# Patient Record
Sex: FEMALE | Race: WHITE | Employment: PART TIME | URBAN - METROPOLITAN AREA
[De-identification: names, ages, dates, MRNs, and addresses within clinical notes are randomized per-mention and may not be internally consistent; named-entity substitution may affect disease eponyms.]

---

## 2017-11-14 ENCOUNTER — HOSPITAL ENCOUNTER (OUTPATIENT)
Dept: MAMMOGRAPHY | Age: 52
Discharge: HOME | End: 2017-11-14
Attending: PEDIATRICS
Payer: COMMERCIAL

## 2017-11-14 DIAGNOSIS — Z12.31 ENCOUNTER FOR SCREENING MAMMOGRAM FOR MALIGNANT NEOPLASM OF BREAST: ICD-10-CM

## 2017-11-14 PROCEDURE — 77063 BREAST TOMOSYNTHESIS BI: CPT | Performed by: OBSTETRICS & GYNECOLOGY

## 2017-11-14 PROCEDURE — 77067 SCR MAMMO BI INCL CAD: CPT | Performed by: OBSTETRICS & GYNECOLOGY

## 2019-01-16 ENCOUNTER — HOSPITAL ENCOUNTER (OUTPATIENT)
Dept: MAMMOGRAPHY | Age: 54
Discharge: HOME | End: 2019-01-16
Attending: OBSTETRICS & GYNECOLOGY
Payer: COMMERCIAL

## 2019-01-16 DIAGNOSIS — Z12.39 BREAST SCREENING: ICD-10-CM

## 2019-01-16 PROCEDURE — 77067 SCR MAMMO BI INCL CAD: CPT | Performed by: OBSTETRICS & GYNECOLOGY

## 2019-01-16 PROCEDURE — 77063 BREAST TOMOSYNTHESIS BI: CPT | Performed by: OBSTETRICS & GYNECOLOGY

## 2021-04-15 ENCOUNTER — OFFICE VISIT (OUTPATIENT)
Dept: FAMILY MEDICINE CLINIC | Facility: CLINIC | Age: 56
End: 2021-04-15
Payer: COMMERCIAL

## 2021-04-15 VITALS
TEMPERATURE: 99 F | WEIGHT: 178 LBS | SYSTOLIC BLOOD PRESSURE: 118 MMHG | BODY MASS INDEX: 27.94 KG/M2 | HEIGHT: 67 IN | RESPIRATION RATE: 16 BRPM | OXYGEN SATURATION: 97 % | DIASTOLIC BLOOD PRESSURE: 70 MMHG | HEART RATE: 67 BPM

## 2021-04-15 DIAGNOSIS — R73.01 IFG (IMPAIRED FASTING GLUCOSE): ICD-10-CM

## 2021-04-15 DIAGNOSIS — E78.2 MIXED HYPERLIPIDEMIA: Primary | ICD-10-CM

## 2021-04-15 DIAGNOSIS — Z12.11 COLON CANCER SCREENING: ICD-10-CM

## 2021-04-15 DIAGNOSIS — E55.9 VITAMIN D INSUFFICIENCY: ICD-10-CM

## 2021-04-15 PROCEDURE — 3074F SYST BP LT 130 MM HG: CPT | Performed by: FAMILY MEDICINE

## 2021-04-15 PROCEDURE — 3078F DIAST BP <80 MM HG: CPT | Performed by: FAMILY MEDICINE

## 2021-04-15 PROCEDURE — 3008F BODY MASS INDEX DOCD: CPT | Performed by: FAMILY MEDICINE

## 2021-04-15 PROCEDURE — 99204 OFFICE O/P NEW MOD 45 MIN: CPT | Performed by: FAMILY MEDICINE

## 2021-04-15 NOTE — PROGRESS NOTES
Patient presents with:  New Patient: establish care & to discuss lab results done 2/16/21. HPI:  Ivanna Ratliff is a 64year old female here today for establishing care. Impaired fasting glucose–hemoglobin A1c was 5.7 on 2/16/2021.  Has tried mul on file    Social Determinants of Health  Financial Resource Strain:       Difficulty of Paying Living Expenses:   Food Insecurity:       Worried About Running Out of Food in the Last Year:       Ran Out of Food in the Last Year:   Transportation Needs: nontender, nondistended. MUSCULOSKELETAL: no kyphosis. Normal gait. NEUROLOGIC:  Cranial nerves 2-12 grossly intact. PSYCHIATRIC:  Normal mood, affect, and hygiene. ASSESSMENT/PLAN:    1. Mixed hyperlipidemia  - LIPID PANEL;  Future  -discussed diet/

## 2021-04-15 NOTE — PATIENT INSTRUCTIONS
Understanding USDA MyPlate  The USDA has guidelines to help you make healthy food choices. These are called MyPlate. MyPlate shows the food groups that make up healthy meals using the image of a place setting.  Before you eat, think about the healthiest c canola, corn, olive, soybean, vegetable, and sunflower oil. Foods that are mainly oil include mayonnaise, certain salad dressings, and soft margarines. You likely already get your daily oil allowance from the foods you eat.   Things to limit  Eating healthy weight. Your diet doesn’t have to be bland and boring to be healthy. Just watch your calories and follow these steps:     Step 1. Eat fewer unhealthy fats  · Choose more fish and lean meats instead of fatty cuts of meat.   · Skip butter and lard, and use le serving size is a standard measurement. It will let you track the amount of fat, calories, and other nutrients in the food you eat. · Read the Nutrition Facts label on packaged foods to learn their serving sizes.   · Use serving sizes to assess how much fo beans, and peas  · Eating less red meat and processed meats  · Using low-fat dairy products  · Using vegetable and nut oils in limited amounts  · Limiting how many sweets and processed foods like chips, cookies, and baked goods that you eat   · Limiting ho cholesterol high, even if you make all the other changes you’re supposed to  · Damages your body. It especially harms your heart, lungs, skin, and blood vessels.   · Makes you more likely to have a heart attack (acute myocardial infarction), stroke, or canc intermediate, high, or very-high risk groups or if you have a multiple risk (enhancers) conditions. Calculating a risk score is done looking at certain risk factors that are considered as an increased risk for atherosclerosis.  The risk calculator tool lo foods or give up desserts. Instead, your dietitian can show you how to plan meals to suit your body. To start, learn how different foods affect blood sugar. Carbohydrates  Carbohydrates (carbs) are the main source of fuel for the body.  They raise blood ocampo cholesterol. So, choosing them instead of saturated fats is healthy for your heart. Certain unsaturated fats can help lower triglycerides.   Less healthy:  · Saturated fats. These are found in animal products, such as meat, poultry, whole milk, lard, and bu don't mix. So our bodies need lipoproteins (lipids inside a protein shell) to carry the lipids.  The protein shell carries its lipids through the bloodstream. There are two main kinds of lipoproteins:  · LDL (low-density lipoprotein) is known as \"bad león active. · Start at a level where you feel comfortable. Increase your time and pace a little each week. · Work up to 30 to 40 minutes of moderate to high intensity physical activity at least 3 to 4 days per week.   · Remember, some activity is better than and shellfish  · Lean beef, pork, or lamb (without visible fat)  · Soy products, such as tofu, soybeans (edamame), tempeh, or soymilk  · Black beans, kidney beans, best beans, chickpeas (garbanzo beans), and lentils (Note: beans and peas count as both a p These include coconut oil, palm oil, and palm kernel oil. These should be consumed less often than other oils. Oils and other fats are also high in calories. Eating too much fat can lead to weight gain and increased risk of heart disease.    Fat facts  Some

## 2021-05-05 ENCOUNTER — HOSPITAL ENCOUNTER (OUTPATIENT)
Dept: MAMMOGRAPHY | Age: 56
Discharge: HOME | End: 2021-05-05
Attending: OBSTETRICS & GYNECOLOGY
Payer: COMMERCIAL

## 2021-05-05 DIAGNOSIS — Z12.39 ENCOUNTER FOR SCREENING FOR MALIGNANT NEOPLASM OF BREAST, UNSPECIFIED SCREENING MODALITY: ICD-10-CM

## 2021-05-05 PROCEDURE — 77067 SCR MAMMO BI INCL CAD: CPT | Performed by: OBSTETRICS & GYNECOLOGY

## 2021-05-05 PROCEDURE — 77063 BREAST TOMOSYNTHESIS BI: CPT | Performed by: OBSTETRICS & GYNECOLOGY

## 2023-06-03 ENCOUNTER — APPOINTMENT (OUTPATIENT)
Dept: GENERAL RADIOLOGY | Age: 58
End: 2023-06-03
Attending: EMERGENCY MEDICINE
Payer: COMMERCIAL

## 2023-06-03 ENCOUNTER — HOSPITAL ENCOUNTER (EMERGENCY)
Age: 58
Discharge: HOME | End: 2023-06-03
Attending: EMERGENCY MEDICINE
Payer: COMMERCIAL

## 2023-06-03 VITALS
OXYGEN SATURATION: 97 % | TEMPERATURE: 98 F | HEART RATE: 70 BPM | HEIGHT: 66 IN | DIASTOLIC BLOOD PRESSURE: 78 MMHG | WEIGHT: 170 LBS | SYSTOLIC BLOOD PRESSURE: 138 MMHG | RESPIRATION RATE: 16 BRPM | BODY MASS INDEX: 27.32 KG/M2

## 2023-06-03 DIAGNOSIS — S92.101A CLOSED NONDISPLACED FRACTURE OF RIGHT TALUS, UNSPECIFIED PORTION OF TALUS, INITIAL ENCOUNTER: Primary | ICD-10-CM

## 2023-06-03 PROCEDURE — 99284 EMERGENCY DEPT VISIT MOD MDM: CPT

## 2023-06-03 PROCEDURE — 29515 APPLICATION SHORT LEG SPLINT: CPT

## 2023-06-03 PROCEDURE — 73630 X-RAY EXAM OF FOOT: CPT | Performed by: EMERGENCY MEDICINE

## 2023-06-03 NOTE — ED QUICK NOTES
Cesar came back to desk after splint and instructed md that pt refuses crutches and stated she will not use them. This rn went down to comfirm that pt did not want crutches. 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 She read the report and wanted to see the images again. When asked again about getting crutches pt looked off and refused to answer.

## 2024-10-30 ENCOUNTER — OFFICE VISIT (OUTPATIENT)
Dept: FAMILY MEDICINE CLINIC | Facility: CLINIC | Age: 59
End: 2024-10-30
Payer: COMMERCIAL

## 2024-10-30 ENCOUNTER — LAB ENCOUNTER (OUTPATIENT)
Dept: LAB | Age: 59
End: 2024-10-30
Attending: FAMILY MEDICINE
Payer: COMMERCIAL

## 2024-10-30 VITALS
DIASTOLIC BLOOD PRESSURE: 70 MMHG | HEIGHT: 66.5 IN | HEART RATE: 68 BPM | OXYGEN SATURATION: 98 % | RESPIRATION RATE: 16 BRPM | SYSTOLIC BLOOD PRESSURE: 122 MMHG | TEMPERATURE: 98 F | WEIGHT: 179 LBS | BODY MASS INDEX: 28.43 KG/M2

## 2024-10-30 DIAGNOSIS — Z12.31 ENCOUNTER FOR SCREENING MAMMOGRAM FOR MALIGNANT NEOPLASM OF BREAST: ICD-10-CM

## 2024-10-30 DIAGNOSIS — Z00.00 LABORATORY EXAM ORDERED AS PART OF ROUTINE GENERAL MEDICAL EXAMINATION: ICD-10-CM

## 2024-10-30 DIAGNOSIS — E78.2 MIXED HYPERLIPIDEMIA: ICD-10-CM

## 2024-10-30 DIAGNOSIS — E55.9 VITAMIN D INSUFFICIENCY: ICD-10-CM

## 2024-10-30 DIAGNOSIS — Z12.11 SCREENING FOR COLON CANCER: ICD-10-CM

## 2024-10-30 DIAGNOSIS — Z23 NEED FOR VACCINATION: ICD-10-CM

## 2024-10-30 DIAGNOSIS — Z00.00 ROUTINE MEDICAL EXAM: Primary | ICD-10-CM

## 2024-10-30 DIAGNOSIS — Z87.81 HISTORY OF ANKLE FRACTURE: ICD-10-CM

## 2024-10-30 DIAGNOSIS — R73.01 IFG (IMPAIRED FASTING GLUCOSE): ICD-10-CM

## 2024-10-30 DIAGNOSIS — R23.8 PAPULE OF SKIN: ICD-10-CM

## 2024-10-30 LAB
ALBUMIN SERPL-MCNC: 4.7 G/DL (ref 3.2–4.8)
ALBUMIN/GLOB SERPL: 1.6 {RATIO} (ref 1–2)
ALP LIVER SERPL-CCNC: 110 U/L
ALT SERPL-CCNC: 26 U/L
ANION GAP SERPL CALC-SCNC: 8 MMOL/L (ref 0–18)
AST SERPL-CCNC: 22 U/L (ref ?–34)
BASOPHILS # BLD AUTO: 0.06 X10(3) UL (ref 0–0.2)
BASOPHILS NFR BLD AUTO: 1.1 %
BILIRUB SERPL-MCNC: 0.5 MG/DL (ref 0.3–1.2)
BUN BLD-MCNC: 13 MG/DL (ref 9–23)
CALCIUM BLD-MCNC: 9.9 MG/DL (ref 8.7–10.4)
CHLORIDE SERPL-SCNC: 104 MMOL/L (ref 98–112)
CHOLEST SERPL-MCNC: 294 MG/DL (ref ?–200)
CO2 SERPL-SCNC: 26 MMOL/L (ref 21–32)
CREAT BLD-MCNC: 0.86 MG/DL
EGFRCR SERPLBLD CKD-EPI 2021: 78 ML/MIN/1.73M2 (ref 60–?)
EOSINOPHIL # BLD AUTO: 0.12 X10(3) UL (ref 0–0.7)
EOSINOPHIL NFR BLD AUTO: 2.3 %
ERYTHROCYTE [DISTWIDTH] IN BLOOD BY AUTOMATED COUNT: 13.4 %
EST. AVERAGE GLUCOSE BLD GHB EST-MCNC: 117 MG/DL (ref 68–126)
FASTING PATIENT LIPID ANSWER: YES
FASTING STATUS PATIENT QL REPORTED: YES
GLOBULIN PLAS-MCNC: 2.9 G/DL (ref 2–3.5)
GLUCOSE BLD-MCNC: 108 MG/DL (ref 70–99)
HBA1C MFR BLD: 5.7 % (ref ?–5.7)
HCT VFR BLD AUTO: 44.3 %
HDLC SERPL-MCNC: 66 MG/DL (ref 40–59)
HGB BLD-MCNC: 14.4 G/DL
IMM GRANULOCYTES # BLD AUTO: 0.01 X10(3) UL (ref 0–1)
IMM GRANULOCYTES NFR BLD: 0.2 %
LDLC SERPL CALC-MCNC: 204 MG/DL (ref ?–100)
LYMPHOCYTES # BLD AUTO: 1.88 X10(3) UL (ref 1–4)
LYMPHOCYTES NFR BLD AUTO: 35.6 %
MCH RBC QN AUTO: 29.6 PG (ref 26–34)
MCHC RBC AUTO-ENTMCNC: 32.5 G/DL (ref 31–37)
MCV RBC AUTO: 91 FL
MONOCYTES # BLD AUTO: 0.48 X10(3) UL (ref 0.1–1)
MONOCYTES NFR BLD AUTO: 9.1 %
NEUTROPHILS # BLD AUTO: 2.73 X10 (3) UL (ref 1.5–7.7)
NEUTROPHILS # BLD AUTO: 2.73 X10(3) UL (ref 1.5–7.7)
NEUTROPHILS NFR BLD AUTO: 51.7 %
NONHDLC SERPL-MCNC: 228 MG/DL (ref ?–130)
OSMOLALITY SERPL CALC.SUM OF ELEC: 287 MOSM/KG (ref 275–295)
PLATELET # BLD AUTO: 329 10(3)UL (ref 150–450)
POTASSIUM SERPL-SCNC: 4.7 MMOL/L (ref 3.5–5.1)
PROT SERPL-MCNC: 7.6 G/DL (ref 5.7–8.2)
RBC # BLD AUTO: 4.87 X10(6)UL
SODIUM SERPL-SCNC: 138 MMOL/L (ref 136–145)
TRIGL SERPL-MCNC: 132 MG/DL (ref 30–149)
VIT D+METAB SERPL-MCNC: 24.5 NG/ML (ref 30–100)
VLDLC SERPL CALC-MCNC: 28 MG/DL (ref 0–30)
WBC # BLD AUTO: 5.3 X10(3) UL (ref 4–11)

## 2024-10-30 PROCEDURE — 82306 VITAMIN D 25 HYDROXY: CPT | Performed by: FAMILY MEDICINE

## 2024-10-30 PROCEDURE — 83036 HEMOGLOBIN GLYCOSYLATED A1C: CPT | Performed by: FAMILY MEDICINE

## 2024-10-30 PROCEDURE — 90656 IIV3 VACC NO PRSV 0.5 ML IM: CPT | Performed by: FAMILY MEDICINE

## 2024-10-30 PROCEDURE — 90472 IMMUNIZATION ADMIN EACH ADD: CPT | Performed by: FAMILY MEDICINE

## 2024-10-30 PROCEDURE — 80061 LIPID PANEL: CPT | Performed by: FAMILY MEDICINE

## 2024-10-30 PROCEDURE — 85025 COMPLETE CBC W/AUTO DIFF WBC: CPT | Performed by: FAMILY MEDICINE

## 2024-10-30 PROCEDURE — 80053 COMPREHEN METABOLIC PANEL: CPT | Performed by: FAMILY MEDICINE

## 2024-10-30 PROCEDURE — 90471 IMMUNIZATION ADMIN: CPT | Performed by: FAMILY MEDICINE

## 2024-10-30 PROCEDURE — 99386 PREV VISIT NEW AGE 40-64: CPT | Performed by: FAMILY MEDICINE

## 2024-10-30 PROCEDURE — 3008F BODY MASS INDEX DOCD: CPT | Performed by: FAMILY MEDICINE

## 2024-10-30 PROCEDURE — 3078F DIAST BP <80 MM HG: CPT | Performed by: FAMILY MEDICINE

## 2024-10-30 PROCEDURE — 90715 TDAP VACCINE 7 YRS/> IM: CPT | Performed by: FAMILY MEDICINE

## 2024-10-30 PROCEDURE — 3074F SYST BP LT 130 MM HG: CPT | Performed by: FAMILY MEDICINE

## 2024-10-30 NOTE — PROGRESS NOTES
Chief Complaint   Patient presents with    New Patient    Physical    Immunization/Injection     Influenza vaccine today & discuss the Shingrix vaccine.       HPI:  Marsha Herrera is a 59 year old female here today for preventative visit.      Imms- discussed flu, COVID, shingrix, & Tdap.      Cervical cancer screening- + LGSIL + HPV on 5/13. Colposcopy 6/11/13 negative. Normal co-testing on 1/3/19 and 2/16/21 with Dr. Juan Jose Ragland. May go to paps q 5yrs.      Breast cancer screening- Brandt  & her daughter (cousin with pre-CA changes) had breast cancer at 63y/o. No mamm on file since 2021.      Colon cancer screening- No family h/o colon cancer.       Osteoporosis screening- no reason identified for early screening with dexa. Had a R ankle fx 6/23 going down a step. Mom had osteoporosis.       Diet/exercise- working on this      Dental/Eye Check up-  Recommended pt see dentist once every 6 months for a cleaning and once every year for an eye exam.        Due to check on chronic issues of IFG, HL, and vit d insuffic.    Papule of neck- been there for a while, but gets irritated. No change in size or bleeding.        Past Medical History:    Ankle fracture    R, going down a step    Bulging lumbar disc    L4-5    History of osteomyelitis    thumb    Hyperlipidemia    IFG (impaired fasting glucose)    Mixed hyperlipidemia    Vitamin D insufficiency     Past Surgical History:   Procedure Laterality Date    Catheter care  1988    for IV abx x 6 wks for osteomyelitis thumb    Lasik Bilateral 2003    [place] teeth removed       Medications Ordered Prior to Encounter[1]  Allergies[2]  Social History     Socioeconomic History    Marital status:      Spouse name: Not on file    Number of children: Not on file    Years of education: Not on file    Highest education level: Not on file   Occupational History    Occupation: X-ray tech, cath lab   Tobacco Use    Smoking status: Some Days     Types: Cigarettes     Start date:  2023     Last attempt to quit: 2000     Years since quittin.8    Smokeless tobacco: Never    Tobacco comments:     Maybe 5 cigs a week   Vaping Use    Vaping status: Never Used   Substance and Sexual Activity    Alcohol use: Yes     Alcohol/week: 6.0 standard drinks of alcohol     Types: 6 Cans of beer per week     Comment: 6 on weekend, never a problem    Drug use: Never    Sexual activity: Yes     Partners: Male     Comment: menopause   Other Topics Concern    Caffeine Concern No    Exercise Yes     Comment: Walking    Seat Belt Yes    Special Diet No    Stress Concern No    Weight Concern No   Social History Narrative    Not on file     Social Drivers of Health     Financial Resource Strain: Not on file   Food Insecurity: Not on file   Transportation Needs: Not on file   Physical Activity: Not on file   Stress: Not on file   Social Connections: Not on file   Housing Stability: Not on file     Family History   Problem Relation Age of Onset    Cancer Mother         Mulitple myeloma    Thyroid Disorder Mother         hypothyroidism    Asthma Mother     Diabetes Father     Hypertension Father     Heart Disease Father     Heart Disorder Father         Cabg    Other (kidney stones) Sister     No Known Problems Brother     Dementia Maternal Grandmother     No Known Problems Maternal Grandfather     Heart Disorder Paternal Grandmother         heart attack    Dementia Paternal Grandmother     Breast Cancer Maternal Aunt 62       Review of Systems - All systems reviewed and negative except for HPI    PHYSICAL EXAM:  /70   Pulse 68   Temp 97.9 °F (36.6 °C) (Temporal)   Resp 16   Ht 5' 6.5\" (1.689 m)   Wt 179 lb (81.2 kg)   SpO2 98%   BMI 28.46 kg/m²   GENERAL APPEARANCE:  Alert, no acute distress, appears stated age  HEENT:  Head- Normocephalic, atraumatic.    Eyes- Extraocular movements intact, pupils equally round and reactive to light,  conjunctivae normal.    Ears- Tympanic membranes intact  bilaterally.    Nose- Patent, normal septum and turbinates.    Mouth/Throat- Normal oral mucosa, throat non-erythematous.  NECK:  No submental, submandibular, ant/post cervical lymphadenopathy. No thyromegaly or masses.  PULMONARY:  Lungs clear to auscultation bilaterally. No wheezes, rales, or rhonchi. Normal respiratory effort.  CARDIOVASCULAR:  Regular rate and rhythm. No murmurs, gallops, or rubs.  ABDOMEN:  + bowel sounds, soft, nontender, nondistended. No hepatomegaly.  MUSCULOSKELETAL: Strength of upper and lower extremities 5/5 bilaterally. Normal gait.  NEUROLOGIC:  Cranial nerves 2-12 grossly intact.  PSYCHIATRIC:  Normal mood, affect, and hygiene.  Breasts: breast appear normal, no suspicious masses, no skin or nipple changes/discharge. No supraclavicular or axillary nodes. Declined chaperone  SKIN: R neck 0.4 x -0.4cm papule skin colored with rough surface.       ASSESSMENT/PLAN:    1. Routine medical exam    2. Screening for colon cancer  - Gastro Referral - In Network    3. Encounter for screening mammogram for malignant neoplasm of breast  - [facility] AYAD 2D+3D SCREENING BILAT (CPT=77067/82380); Future    4. Laboratory exam ordered as part of routine general medical examination  - CBC W Differential W Platelet [E]; Future  - Comp Metabolic Panel (14) [E]; Future  - Lipid Panel; Future    5. Need for vaccination  - Immunization Admin Counseling, 1st Component, 18 years and older  - Fluzone trivalent vaccine, PF 0.5mL, 6mo+ (79958)  - Immunization Admin Counseling, Additional Component, 18 years and older  - TdaP (Boostrix/Adacel) Vaccine (> 7 Y)    6. IFG (impaired fasting glucose)  - Hemoglobin A1C [E]; Future    7. Mixed hyperlipidemia  - Comp Metabolic Panel (14) [E]; Future  - Lipid Panel; Future    8. History of ankle fracture  - XR DEXA BONE DENSITOMETRY (CPT=77080); Future    9. Vitamin D insufficiency  - Vitamin D [E]; Future    10. Papule of skin  -diff dx: wart vs seborrheic keratosis  -shave bx  when convenient      Patient verbalized understanding and agrees to plan.      Return in about 1 year (around 10/30/2025) for annual physical, we will contact you with results, skin shave biopsy (40min procedure).         [1]   No current outpatient medications on file prior to visit.     No current facility-administered medications on file prior to visit.   [2] No Known Allergies

## 2024-11-12 ENCOUNTER — OFFICE VISIT (OUTPATIENT)
Dept: FAMILY MEDICINE CLINIC | Facility: CLINIC | Age: 59
End: 2024-11-12
Payer: COMMERCIAL

## 2024-11-12 VITALS
RESPIRATION RATE: 16 BRPM | HEART RATE: 66 BPM | TEMPERATURE: 98 F | SYSTOLIC BLOOD PRESSURE: 120 MMHG | OXYGEN SATURATION: 98 % | WEIGHT: 179 LBS | BODY MASS INDEX: 28.43 KG/M2 | DIASTOLIC BLOOD PRESSURE: 70 MMHG | HEIGHT: 66.5 IN

## 2024-11-12 DIAGNOSIS — R23.8 PAPULE OF SKIN: Primary | ICD-10-CM

## 2024-11-12 PROCEDURE — 3008F BODY MASS INDEX DOCD: CPT | Performed by: FAMILY MEDICINE

## 2024-11-12 PROCEDURE — 88305 TISSUE EXAM BY PATHOLOGIST: CPT | Performed by: FAMILY MEDICINE

## 2024-11-12 PROCEDURE — 3078F DIAST BP <80 MM HG: CPT | Performed by: FAMILY MEDICINE

## 2024-11-12 PROCEDURE — 3074F SYST BP LT 130 MM HG: CPT | Performed by: FAMILY MEDICINE

## 2024-11-12 PROCEDURE — 11420 EXC H-F-NK-SP B9+MARG 0.5/<: CPT | Performed by: FAMILY MEDICINE

## 2024-11-12 NOTE — PROGRESS NOTES
[facility] Visit Note  11/12/2024      Subjective:      Patient ID: Marsha Herrera is a 59 year old female.    Chief Complaint:  Chief Complaint   Patient presents with    Procedure     Here for shave biopsy of right side neck lesion.       HPI:  Marsha Herrera is a 59 year old female who is being seen today for procedure.      Neck lesion- been there for a while, but gets irritated. No change in size, bleeding, drainage. Requesting lesion be removed.           Review of Systems - as stated above in the HPI      Objective:     Vitals:    11/12/24 0749   BP: 120/70   Pulse: 66   Resp: 16   Temp: 98.1 °F (36.7 °C)   TempSrc: Temporal   SpO2: 98%   Weight: 179 lb (81.2 kg)   Height: 5' 6.5\" (1.689 m)       Physical Examination   General:  Alert, in no acute distress  HEENT: NCAT, EOMI, mucus membranes moist   Neck:  No cervical lymphadenopathy  CV: Regular rate and rhythm. No murmurs, gallops, or rubs.  Lungs:  Clear to auscultation B, no wheezes, rales, or rhonchi, normal respiratory effort  Abd:  +bowel sounds, soft  Ext:  No pedal edema,  Pedal pulses 2+ B  SKIN: R neck 0.4 x -0.4cm papule skin colored with rough surface.     Procedure: Shave biopsy   Indication: Changing skin lesion   Physician: Dr. Scarlet Chase   Assistant: none  Consent: written consent was obtained after discussing potential alternatives, benefits, and risks including but not limited to: infection, pain, bleeding.   Description: The area was prepped with iodine. Site was anesthetized with 1 mL of lidocaine with epinephrine. Once adequate anesthesia was obtained lesion was removed using a 10 blade scalpel. Specimen was placed in a formalin container and sent for pathology analysis. Hemostasis was achieved with pressure & silver nitrate. Area cleaned with alcohol. DOM and a band-aid was applied.   EBL: <5mL  Complications: none        Assessment:     1. Papule of skin  - Specimen to Pathology, Tissue; Future  - Specimen to Pathology,  Tissue  -discussed signs/sxs that would warrant re-evaluation such as increasing pain, fever, drainage, redness, etc.  -pt verbalizes understanding and agrees to plan.        Return for we will contact you with results & f/u to discuss labs, call if concerns develop.

## 2024-12-04 ENCOUNTER — HOSPITAL ENCOUNTER (OUTPATIENT)
Dept: MAMMOGRAPHY | Age: 59
Discharge: HOME | End: 2024-12-04
Attending: FAMILY MEDICINE
Payer: COMMERCIAL

## 2024-12-04 ENCOUNTER — HOSPITAL ENCOUNTER (OUTPATIENT)
Dept: BONE DENSITY | Age: 59
Discharge: HOME | End: 2024-12-04
Attending: FAMILY MEDICINE
Payer: COMMERCIAL

## 2024-12-04 DIAGNOSIS — Z87.81 HISTORY OF ANKLE FRACTURE: ICD-10-CM

## 2024-12-04 DIAGNOSIS — Z12.31 ENCOUNTER FOR SCREENING MAMMOGRAM FOR MALIGNANT NEOPLASM OF BREAST: ICD-10-CM

## 2024-12-04 PROCEDURE — 77080 DXA BONE DENSITY AXIAL: CPT | Performed by: FAMILY MEDICINE

## 2024-12-04 PROCEDURE — 77067 SCR MAMMO BI INCL CAD: CPT | Performed by: FAMILY MEDICINE

## 2024-12-04 PROCEDURE — 77063 BREAST TOMOSYNTHESIS BI: CPT | Performed by: FAMILY MEDICINE

## 2024-12-11 ENCOUNTER — OFFICE VISIT (OUTPATIENT)
Dept: FAMILY MEDICINE CLINIC | Facility: CLINIC | Age: 59
End: 2024-12-11
Payer: COMMERCIAL

## 2024-12-11 VITALS
SYSTOLIC BLOOD PRESSURE: 122 MMHG | HEIGHT: 66.5 IN | HEART RATE: 62 BPM | WEIGHT: 179 LBS | OXYGEN SATURATION: 99 % | RESPIRATION RATE: 16 BRPM | TEMPERATURE: 98 F | BODY MASS INDEX: 28.43 KG/M2 | DIASTOLIC BLOOD PRESSURE: 62 MMHG

## 2024-12-11 DIAGNOSIS — E55.9 VITAMIN D INSUFFICIENCY: ICD-10-CM

## 2024-12-11 DIAGNOSIS — J06.9 VIRAL URI: ICD-10-CM

## 2024-12-11 DIAGNOSIS — Z71.6 ENCOUNTER FOR TOBACCO USE CESSATION COUNSELING: ICD-10-CM

## 2024-12-11 DIAGNOSIS — E78.01 FAMILIAL HYPERCHOLESTEROLEMIA: Primary | ICD-10-CM

## 2024-12-11 DIAGNOSIS — M85.80 OSTEOPENIA, UNSPECIFIED LOCATION: ICD-10-CM

## 2024-12-11 DIAGNOSIS — Z83.49 FAMILY HISTORY OF THYROID DISEASE: ICD-10-CM

## 2024-12-11 DIAGNOSIS — F17.200 CURRENT SMOKER ON SOME DAYS: ICD-10-CM

## 2024-12-11 PROCEDURE — 3074F SYST BP LT 130 MM HG: CPT | Performed by: FAMILY MEDICINE

## 2024-12-11 PROCEDURE — 3078F DIAST BP <80 MM HG: CPT | Performed by: FAMILY MEDICINE

## 2024-12-11 PROCEDURE — 3008F BODY MASS INDEX DOCD: CPT | Performed by: FAMILY MEDICINE

## 2024-12-11 PROCEDURE — 99214 OFFICE O/P EST MOD 30 MIN: CPT | Performed by: FAMILY MEDICINE

## 2024-12-11 PROCEDURE — G2211 COMPLEX E/M VISIT ADD ON: HCPCS | Performed by: FAMILY MEDICINE

## 2024-12-11 RX ORDER — ROSUVASTATIN CALCIUM 10 MG/1
10 TABLET, COATED ORAL NIGHTLY
Qty: 90 TABLET | Refills: 0 | Status: SHIPPED | OUTPATIENT
Start: 2024-12-11

## 2024-12-11 NOTE — PROGRESS NOTES
[facility] Visit Note  12/11/2024      Subjective:      Patient ID: Marsha Herrera is a 59 year old female.    Chief Complaint:  Chief Complaint   Patient presents with    Test Results     States her to discuss her 10/30/24 lab results & 12/4/24 dexa results.    Post Nasal Drip     States she woke up with a sore throat yesterday morning & post nasal drip. Sore throat has subsided but still having a lot of pnd. Took home covid test this morning which was negative.       HPI:  Marsha Herrera is a 59 year old female who is being seen today for the above.      Postnasal drip- she woke up with a sore throat yesterday morning & post nasal drip. Sore throat has subsided but still having a lot of pnd. Took home covid test this morning which was negative. Keeps clearing her throat. Clear drainage      HL-  , tot chol 294, HDL 66,  on 10/30/24.   LDL was 189 in 2019, 172 in 2021. Says was faithful with exercise until 2020, so even when was doing well her cholesterol was high.  Brother is on cholesterol meds  Dad had a CABG.  Just started walking in August, will go back to the gym now with the weather.       Vit d insuff - 24.5 on 10/30/24.      Osteopenia- dexa on 12/4/24. Smoked only on sat when out and has stopped in the last week.   ETOH is 1-2x/wk.   Walking now.        Review of Systems - as stated above in the HPI      Objective:     Vitals:    12/11/24 1037   BP: 122/62   Pulse: 62   Resp: 16   Temp: 98.1 °F (36.7 °C)   TempSrc: Temporal   SpO2: 99%   Weight: 179 lb (81.2 kg)   Height: 5' 6.5\" (1.689 m)       Physical Examination   General:  Alert, in no acute distress  HEENT: NCAT, EOMI, normal TMs, oropharynx, and nasal turbinates. NO pain with palpation of sinuses  Neck:  No cervical lymphadenopathy, no thyromegaly  CV: Regular rate and rhythm. No murmurs, gallops, or rubs.  Lungs:  Clear to auscultation B, no wheezes, rales, or rhonchi, normal respiratory effort  Abd:  +bowel sounds, soft  Ext:   No pedal edema,  Pedal pulses 2+ B        Assessment:     1. Familial hypercholesterolemia  - rosuvastatin 10 MG Oral Tab; Take 1 tablet (10 mg total) by mouth nightly.  Dispense: 90 tablet; Refill: 0  - Lipid Panel [E]; Future  - Comp Metabolic Panel (14) [E]; Future    2. Osteopenia, unspecified location    3. Vitamin D insufficiency    4. Current smoker on some days  - Smoking Cessation less than 3 minutes    5. Encounter for tobacco use cessation counseling  - Smoking Cessation less than 3 minutes    6. Viral URI    7. Family history of thyroid disease  - Assay, Thyroid Stim Hormone; Future        I am providing care as part of an ongoing, longitudinal care relationship.    Return in about 3 months (around 3/11/2025) for f/u high cholesterol (fasting labs beforehand).

## 2025-03-13 ENCOUNTER — LAB ENCOUNTER (OUTPATIENT)
Dept: LAB | Age: 60
End: 2025-03-13
Attending: FAMILY MEDICINE
Payer: COMMERCIAL

## 2025-03-13 DIAGNOSIS — Z83.49 FAMILY HISTORY OF THYROID DISEASE: ICD-10-CM

## 2025-03-13 DIAGNOSIS — E78.01 FAMILIAL HYPERCHOLESTEROLEMIA: ICD-10-CM

## 2025-03-13 LAB
ALBUMIN SERPL-MCNC: 4.8 G/DL (ref 3.2–4.8)
ALBUMIN/GLOB SERPL: 2 {RATIO} (ref 1–2)
ALP LIVER SERPL-CCNC: 100 U/L
ALT SERPL-CCNC: 20 U/L
ANION GAP SERPL CALC-SCNC: 8 MMOL/L (ref 0–18)
AST SERPL-CCNC: 21 U/L (ref ?–34)
BILIRUB SERPL-MCNC: 0.7 MG/DL (ref 0.2–1.1)
BUN BLD-MCNC: 13 MG/DL (ref 9–23)
CALCIUM BLD-MCNC: 10 MG/DL (ref 8.7–10.6)
CHLORIDE SERPL-SCNC: 105 MMOL/L (ref 98–112)
CHOLEST SERPL-MCNC: 163 MG/DL (ref ?–200)
CO2 SERPL-SCNC: 28 MMOL/L (ref 21–32)
CREAT BLD-MCNC: 0.88 MG/DL
EGFRCR SERPLBLD CKD-EPI 2021: 75 ML/MIN/1.73M2 (ref 60–?)
FASTING PATIENT LIPID ANSWER: YES
FASTING STATUS PATIENT QL REPORTED: YES
GLOBULIN PLAS-MCNC: 2.4 G/DL (ref 2–3.5)
GLUCOSE BLD-MCNC: 93 MG/DL (ref 70–99)
HDLC SERPL-MCNC: 62 MG/DL (ref 40–59)
LDLC SERPL CALC-MCNC: 88 MG/DL (ref ?–100)
NONHDLC SERPL-MCNC: 101 MG/DL (ref ?–130)
OSMOLALITY SERPL CALC.SUM OF ELEC: 292 MOSM/KG (ref 275–295)
POTASSIUM SERPL-SCNC: 4.6 MMOL/L (ref 3.5–5.1)
PROT SERPL-MCNC: 7.2 G/DL (ref 5.7–8.2)
SODIUM SERPL-SCNC: 141 MMOL/L (ref 136–145)
TRIGL SERPL-MCNC: 69 MG/DL (ref 30–149)
TSI SER-ACNC: 2.61 UIU/ML (ref 0.55–4.78)
VLDLC SERPL CALC-MCNC: 11 MG/DL (ref 0–30)

## 2025-03-13 PROCEDURE — 84443 ASSAY THYROID STIM HORMONE: CPT

## 2025-03-13 PROCEDURE — 80053 COMPREHEN METABOLIC PANEL: CPT

## 2025-03-13 PROCEDURE — 36415 COLL VENOUS BLD VENIPUNCTURE: CPT

## 2025-03-13 PROCEDURE — 80061 LIPID PANEL: CPT

## 2025-03-17 DIAGNOSIS — E78.01 FAMILIAL HYPERCHOLESTEROLEMIA: ICD-10-CM

## 2025-03-17 RX ORDER — ROSUVASTATIN CALCIUM 10 MG/1
10 TABLET, COATED ORAL NIGHTLY
Qty: 90 TABLET | Refills: 0 | Status: CANCELLED | OUTPATIENT
Start: 2025-03-17

## 2025-03-17 NOTE — PROGRESS NOTES
Hello,     Your lab results have been reviewed. Per Dr. Goss's notes, she wanted to see you back to discuss next steps based on your updated labs. Please schedule a visit when able.     Dr. Beatty (covering for Dr. Chase)

## 2025-03-17 NOTE — TELEPHONE ENCOUNTER
Requested Renewals     rosuvastatin 10 MG Oral Tab         Sig: Take 1 tablet (10 mg total) by mouth nightly.    Disp: 90 tablet    Refills: 0    Start: 3/17/2025    Class: Normal    Non-formulary For: Familial hypercholesterolemia    Last ordered: 3 months ago (12/11/2024) by Scarlet Chase MD    Patient comment: Do I continue on same dosage?              No future appointments.  LOV: 12/11/24  RTC: 3 months  Labs done 3/13/25    Please advise on above message.

## 2025-03-18 RX ORDER — ROSUVASTATIN CALCIUM 10 MG/1
10 TABLET, COATED ORAL NIGHTLY
Qty: 90 TABLET | Refills: 0 | Status: CANCELLED | OUTPATIENT
Start: 2025-03-18

## 2025-03-18 NOTE — TELEPHONE ENCOUNTER
Future Appointments   Date Time Provider Department Center   3/19/2025 11:40 AM Scarlet Chase MD [facility] [address] [facility] [address]

## 2025-03-19 ENCOUNTER — TELEMEDICINE (OUTPATIENT)
Dept: FAMILY MEDICINE CLINIC | Facility: CLINIC | Age: 60
End: 2025-03-19
Payer: COMMERCIAL

## 2025-03-19 DIAGNOSIS — E78.01 FAMILIAL HYPERCHOLESTEROLEMIA: Primary | ICD-10-CM

## 2025-03-19 PROCEDURE — 98004 SYNCH AUDIO-VIDEO EST SF 10: CPT | Performed by: FAMILY MEDICINE

## 2025-03-19 RX ORDER — ROSUVASTATIN CALCIUM 10 MG/1
10 TABLET, COATED ORAL NIGHTLY
Qty: 90 TABLET | Refills: 3 | Status: SHIPPED | OUTPATIENT
Start: 2025-03-19

## 2025-03-19 NOTE — PROGRESS NOTES
Video Visit- [facility]  This is a telemedicine visit with live, interactive video and audio.     Marsha Herrera understands and accepts financial responsibility for any deductible, co-insurance and/or co-pays associated with this service for the date of 03/19/25.      Duration of the service: 5 minutes  12:09-12:14PM      Chief Complaint   Patient presents with    Cholesterol     F/u start of rosuvastatin          HPI:    HL- Is taking rosuvastatin 10mg. Eating more oatmeal. Is walking 4x/wk at least 3mi at a time. Drinking and smoking have slowed down. Cholesterol came down wonderfully from , tot chol 294, , HDL 66, down to LDL 88, tot chol 163, TG 69, & HDL 62.        Past Medical History:    Ankle fracture    R, going down a step    Bulging lumbar disc    L4-5    History of osteomyelitis    thumb    Hyperlipidemia    IFG (impaired fasting glucose)    Mixed hyperlipidemia    Osteopenia    Vitamin D insufficiency       Past Surgical History:   Procedure Laterality Date    Catheter care  1988    for IV abx x 6 wks for osteomyelitis thumb    Lasik Bilateral 2003    [place] teeth removed         Family History   Problem Relation Age of Onset    Cancer Mother         Mulitple myeloma    Thyroid Disorder Mother         hypothyroidism    Asthma Mother     Diabetes Father     Hypertension Father     Heart Disease Father     Heart Disorder Father         Cabg    Other (kidney stones) Sister     No Known Problems Brother     Dementia Maternal Grandmother     No Known Problems Maternal Grandfather     Heart Disorder Paternal Grandmother         heart attack    Dementia Paternal Grandmother     Breast Cancer Maternal Aunt 62           Physical Exam:  There were no vitals taken for this visit.    GENERAL APPEARANCE:  Alert, no acute distress, appears stated age  HEENT:  NCAT, EOMI, mucus membranes moist  NECK:  No obvious goiter  PULMONARY:  Speaking in full sentences  comfortably, normal work of breathing  ABDOMEN:  not obese  MUSCULOSKELETAL: Sitting upright.   NEUROLOGIC:  Cranial nerves 2-12 grossly intact.  PSYCHIATRIC:  Normal mood, affect, and hygiene.        Assessment/Plan:  1. Familial hypercholesterolemia  - rosuvastatin 10 MG Oral Tab; Take 1 tablet (10 mg total) by mouth nightly.  Dispense: 90 tablet; Refill: 3  -great improvement with rosuvastatin, cont.        Return for annual physical in Oct..      Scarlet Chase MD      Please note that the following visit was completed using two-way, real-time interactive audio and visual communication. This has been done in good nadia to provide continuity of care in the best interest of the provider-patient relationship, due to the ongoing public health crisis/national emergency and because of restrictions of visitation. There are limitations of this visit as physical examination was limited.  Appropriate medical decision-making and tests are ordered as detailed in the plan of care above.

## (undated) NOTE — LETTER
Date & Time: 6/3/2023, 11:15 AM  Patient: Carolin Garcia  Encounter Provider(s):    Martin Lawson DO       To Whom It May Concern:    Carolin Garcia was seen and treated in our department on 6/3/2023. She should not return to work until cleared by orthopedics .     If you have any questions or concerns, please do not hesitate to call.        _____________________________  Physician/APC Signature